# Patient Record
Sex: MALE | Race: WHITE
[De-identification: names, ages, dates, MRNs, and addresses within clinical notes are randomized per-mention and may not be internally consistent; named-entity substitution may affect disease eponyms.]

---

## 2018-12-05 ENCOUNTER — HOSPITAL ENCOUNTER (EMERGENCY)
Dept: HOSPITAL 26 - MED | Age: 28
Discharge: HOME | End: 2018-12-05
Payer: SELF-PAY

## 2018-12-05 VITALS — SYSTOLIC BLOOD PRESSURE: 145 MMHG | DIASTOLIC BLOOD PRESSURE: 94 MMHG

## 2018-12-05 VITALS — HEIGHT: 74 IN | BODY MASS INDEX: 30.03 KG/M2 | WEIGHT: 234 LBS

## 2018-12-05 VITALS — DIASTOLIC BLOOD PRESSURE: 94 MMHG | SYSTOLIC BLOOD PRESSURE: 145 MMHG
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
## 2018-12-05 DIAGNOSIS — S02.5XXA: Primary | ICD-10-CM
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
## 2020-07-08 ENCOUNTER — HOSPITAL ENCOUNTER (EMERGENCY)
Dept: HOSPITAL 26 - MED | Age: 30
Discharge: HOME | End: 2020-07-08
Payer: SELF-PAY

## 2020-07-08 VITALS — HEIGHT: 74 IN | WEIGHT: 215 LBS | BODY MASS INDEX: 27.59 KG/M2

## 2020-07-08 VITALS — SYSTOLIC BLOOD PRESSURE: 146 MMHG | DIASTOLIC BLOOD PRESSURE: 110 MMHG

## 2020-07-08 DIAGNOSIS — F17.210: ICD-10-CM

## 2020-07-08 DIAGNOSIS — K52.9: Primary | ICD-10-CM

## 2020-07-08 NOTE — NUR
Patient discharged with v/s stable. Written and verbal after care instructions 
given and explained. 

Patient alert, oriented and verbalized understanding of instructions. 
Ambulatory with steady gait. All questions addressed prior to discharge. ID 
band removed. Patient advised to follow up with PMD. Rx of NOFRAN, AND MOTRIN 
given. Patient educated on indication of medication including possible reaction 
and side effects. Opportunity to ask questions provided and answered.

## 2020-11-24 ENCOUNTER — HOSPITAL ENCOUNTER (EMERGENCY)
Dept: HOSPITAL 26 - MED | Age: 30
Discharge: HOME | End: 2020-11-24
Payer: MEDICAID

## 2020-11-24 VITALS — SYSTOLIC BLOOD PRESSURE: 184 MMHG | DIASTOLIC BLOOD PRESSURE: 98 MMHG

## 2020-11-24 VITALS — DIASTOLIC BLOOD PRESSURE: 90 MMHG | SYSTOLIC BLOOD PRESSURE: 137 MMHG

## 2020-11-24 VITALS — WEIGHT: 207 LBS | HEIGHT: 74 IN | BODY MASS INDEX: 26.56 KG/M2

## 2020-11-24 DIAGNOSIS — R03.0: ICD-10-CM

## 2020-11-24 DIAGNOSIS — M25.519: Primary | ICD-10-CM

## 2020-11-24 DIAGNOSIS — F17.210: ICD-10-CM

## 2020-11-24 DIAGNOSIS — F41.9: ICD-10-CM

## 2020-11-24 LAB
ALBUMIN FLD-MCNC: 4.6 G/DL (ref 3.4–5)
ANION GAP SERPL CALCULATED.3IONS-SCNC: 14.9 MMOL/L (ref 8–16)
AST SERPL-CCNC: 16 U/L (ref 15–37)
BASOPHILS # BLD AUTO: 0.2 K/UL (ref 0–0.22)
BASOPHILS NFR BLD AUTO: 2.2 % (ref 0–2)
BILIRUB SERPL-MCNC: 0.6 MG/DL (ref 0–1)
BUN SERPL-MCNC: 16 MG/DL (ref 7–18)
CHLORIDE SERPL-SCNC: 105 MMOL/L (ref 98–107)
CO2 SERPL-SCNC: 26.3 MMOL/L (ref 21–32)
CREAT SERPL-MCNC: 0.8 MG/DL (ref 0.6–1.3)
EOSINOPHIL # BLD AUTO: 0 K/UL (ref 0–0.4)
EOSINOPHIL NFR BLD AUTO: 0.5 % (ref 0–4)
ERYTHROCYTE [DISTWIDTH] IN BLOOD BY AUTOMATED COUNT: 13.5 % (ref 11.6–13.7)
GFR SERPL CREATININE-BSD FRML MDRD: 145 ML/MIN (ref 90–?)
GLUCOSE SERPL-MCNC: 115 MG/DL (ref 74–106)
HCT VFR BLD AUTO: 43.6 % (ref 36–52)
HGB BLD-MCNC: 14.9 G/DL (ref 12–18)
LYMPHOCYTES # BLD AUTO: 2 K/UL (ref 2–11.5)
LYMPHOCYTES NFR BLD AUTO: 21.1 % (ref 20.5–51.1)
MCH RBC QN AUTO: 31 PG (ref 27–31)
MCHC RBC AUTO-ENTMCNC: 34 G/DL (ref 33–37)
MCV RBC AUTO: 91.9 FL (ref 80–94)
MONOCYTES # BLD AUTO: 0.3 K/UL (ref 0.8–1)
MONOCYTES NFR BLD AUTO: 3.4 % (ref 1.7–9.3)
NEUTROPHILS # BLD AUTO: 6.8 K/UL (ref 1.8–7.7)
NEUTROPHILS NFR BLD AUTO: 72.8 % (ref 42.2–75.2)
PLATELET # BLD AUTO: 259 K/UL (ref 140–450)
POTASSIUM SERPL-SCNC: 3.2 MMOL/L (ref 3.5–5.1)
RBC # BLD AUTO: 4.75 MIL/UL (ref 4.2–6.1)
SODIUM SERPL-SCNC: 143 MMOL/L (ref 136–145)
WBC # BLD AUTO: 9.3 K/UL (ref 4.8–10.8)

## 2020-11-24 NOTE — NUR
Patient medically cleared for discharge by MD with v/s stable. Written and 
verbal after care instructions given and explained. 

Patient alert, oriented and verbalized understanding of instructions. 
Ambulatory with steady gait. All questions addressed prior to discharge. ID 
band removed. Patient advised to follow up with PMD. Rx of flexeril and motrin 
given. Patient educated on indication of medication including possible reaction 
and side effects. Opportunity to ask questions provided and answered. Patient 
left the ED with all his belongings

## 2020-11-24 NOTE — NUR
BIBS from home with c/o left arm pain and difficulty with ROM, denies any 
trauma works in construction

NKDA, PMH patient denies any. A, A, O x 4, cooperative, HOB elevated

Well appearing 30 yo male c/o 5-8/10, moving other 3 exts w/o difficulty

Resp even and unlabored, in NAD. Denies cough, fever

VVS, sinus rhythm

Awaiting evaluation/examination by MD, will continue to monitor

## 2020-12-01 ENCOUNTER — HOSPITAL ENCOUNTER (EMERGENCY)
Dept: HOSPITAL 26 - MED | Age: 30
Discharge: HOME | End: 2020-12-01
Payer: SELF-PAY

## 2020-12-01 VITALS — BODY MASS INDEX: 26.56 KG/M2 | WEIGHT: 207 LBS | HEIGHT: 74 IN

## 2020-12-01 VITALS — SYSTOLIC BLOOD PRESSURE: 136 MMHG | DIASTOLIC BLOOD PRESSURE: 87 MMHG

## 2020-12-01 DIAGNOSIS — L08.9: ICD-10-CM

## 2020-12-01 DIAGNOSIS — K08.89: Primary | ICD-10-CM

## 2020-12-01 NOTE — NUR
Patient discharged with v/s stable. Written and verbal after care instructions 
given and explained. 

Patient alert, oriented and verbalized understanding of instructions. 
Ambulatory with steady gait. All questions addressed prior to discharge. ID 
band removed. Patient advised to follow up with PMD. Rx of PEN VK given. 
Patient educated on indication of medication including possible reaction and 
side effects. Opportunity to ask questions provided and answered.

## 2021-04-08 ENCOUNTER — HOSPITAL ENCOUNTER (EMERGENCY)
Dept: HOSPITAL 26 - MED | Age: 31
Discharge: HOME | End: 2021-04-08
Payer: SELF-PAY

## 2021-04-08 VITALS — DIASTOLIC BLOOD PRESSURE: 90 MMHG | SYSTOLIC BLOOD PRESSURE: 153 MMHG

## 2021-04-08 VITALS — HEIGHT: 74 IN | BODY MASS INDEX: 28.23 KG/M2 | WEIGHT: 220 LBS

## 2021-04-08 DIAGNOSIS — K04.7: Primary | ICD-10-CM

## 2021-04-08 DIAGNOSIS — Z79.899: ICD-10-CM

## 2021-04-08 DIAGNOSIS — F17.200: ICD-10-CM

## 2022-01-21 ENCOUNTER — HOSPITAL ENCOUNTER (EMERGENCY)
Dept: HOSPITAL 26 - MED | Age: 32
Discharge: HOME | End: 2022-01-21
Payer: COMMERCIAL

## 2022-01-21 VITALS — HEIGHT: 74 IN | WEIGHT: 249 LBS | BODY MASS INDEX: 31.95 KG/M2

## 2022-01-21 VITALS — DIASTOLIC BLOOD PRESSURE: 90 MMHG | SYSTOLIC BLOOD PRESSURE: 144 MMHG

## 2022-01-21 DIAGNOSIS — S76.811A: Primary | ICD-10-CM
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
## 2022-01-21 DIAGNOSIS — F17.210: ICD-10-CM
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
## 2022-01-21 DIAGNOSIS — I10: ICD-10-CM

## 2022-01-21 PROCEDURE — 99284 EMERGENCY DEPT VISIT MOD MDM: CPT

## 2022-01-21 PROCEDURE — 76870 US EXAM SCROTUM: CPT

## 2022-01-21 PROCEDURE — 81002 URINALYSIS NONAUTO W/O SCOPE: CPT
